# Patient Record
Sex: FEMALE | Race: WHITE | URBAN - METROPOLITAN AREA
[De-identification: names, ages, dates, MRNs, and addresses within clinical notes are randomized per-mention and may not be internally consistent; named-entity substitution may affect disease eponyms.]

---

## 2020-10-01 ENCOUNTER — OFFICE VISIT (OUTPATIENT)
Dept: ENT CLINIC | Age: 25
End: 2020-10-01

## 2020-10-01 VITALS
HEART RATE: 93 BPM | HEIGHT: 68 IN | WEIGHT: 140.8 LBS | BODY MASS INDEX: 21.34 KG/M2 | SYSTOLIC BLOOD PRESSURE: 115 MMHG | TEMPERATURE: 98.2 F | DIASTOLIC BLOOD PRESSURE: 79 MMHG

## 2020-10-01 PROCEDURE — 99203 OFFICE O/P NEW LOW 30 MIN: CPT | Performed by: OTOLARYNGOLOGY

## 2020-10-01 RX ORDER — FLUTICASONE PROPIONATE 50 MCG
1 SPRAY, SUSPENSION (ML) NASAL DAILY
COMMUNITY

## 2020-10-01 RX ORDER — ESCITALOPRAM OXALATE 10 MG/1
10 TABLET ORAL DAILY
COMMUNITY
Start: 2020-06-25

## 2020-10-01 NOTE — PROGRESS NOTES
Subjective:      Patient ID: Brandon Srinivasan is a 26 y.o. female. HPI  Voice Disorders  CC: hoarseness  Keny Jimenez is a(n) 26 y.o. female who presents with a 6 month history of vocal fatigue, raspiness, worse by the tee of the week. Had a hemithyroidectomy 3+ years ago. No voice problems after. TSH normal. .   Daily:Yes  Worse in in the evening  Dysphagia:No  Odynophagia:No  Cough:No  Dyspnea: No  Recent cold or flu:No  Reflux disease: No  History of throat or chest surgery:No  The voice is gradually worsening  A lack of voice effects my daily life greatly  Previous episodes:No  Prior voice therapy:No      There is no problem list on file for this patient. History reviewed. No pertinent surgical history. History reviewed. No pertinent family history.   Social History     Socioeconomic History    Marital status: Not on file     Spouse name: Not on file    Number of children: Not on file    Years of education: Not on file    Highest education level: Not on file   Occupational History    Not on file   Social Needs    Financial resource strain: Not on file    Food insecurity     Worry: Not on file     Inability: Not on file    Transportation needs     Medical: Not on file     Non-medical: Not on file   Tobacco Use    Smoking status: Never Smoker    Smokeless tobacco: Never Used   Substance and Sexual Activity    Alcohol use: Yes     Comment: rare    Drug use: Never    Sexual activity: Not on file   Lifestyle    Physical activity     Days per week: Not on file     Minutes per session: Not on file    Stress: Not on file   Relationships    Social connections     Talks on phone: Not on file     Gets together: Not on file     Attends Shinto service: Not on file     Active member of club or organization: Not on file     Attends meetings of clubs or organizations: Not on file     Relationship status: Not on file    Intimate partner violence     Fear of current or ex partner: Not on file     Emotionally abused: Not on file     Physically abused: Not on file     Forced sexual activity: Not on file   Other Topics Concern    Not on file   Social History Narrative    Not on file       DRUG/FOOD ALLERGIES: Patient has no known allergies. CURRENT MEDICATIONS  Prior to Admission medications    Medication Sig Start Date End Date Taking? Authorizing Provider   escitalopram (LEXAPRO) 10 MG tablet Take 10 mg by mouth daily 6/25/20  Yes Historical Provider, MD   fluticasone (FLONASE) 50 MCG/ACT nasal spray 1 spray by Nasal route daily   Yes Historical Provider, MD   Cetirizine HCl (ZYRTEC PO) Take 10 mg by mouth daily   Yes Historical Provider, MD   IRON, FERROUS SULFATE, PO One by mouth daily during menses 12/19/18  Yes Historical Provider, MD         Review of Systems    Objective:   Physical Exam  Constitutional:       Appearance: She is well-developed. She is not ill-appearing. HENT:      Head: Normocephalic and atraumatic. Not macrocephalic and not microcephalic. No raccoon eyes, Frost's sign, abrasion, contusion, right periorbital erythema, left periorbital erythema or laceration. Hair is normal.      Jaw: No trismus. Salivary Glands: Right salivary gland is not diffusely enlarged. Left salivary gland is not diffusely enlarged. Right Ear: Hearing, tympanic membrane and external ear normal. No decreased hearing noted. No drainage, swelling or tenderness. No middle ear effusion. No mastoid tenderness. Tympanic membrane is not perforated, retracted or bulging. Tympanic membrane has normal mobility. Left Ear: Hearing, tympanic membrane and external ear normal. No decreased hearing noted. No drainage, swelling or tenderness. No middle ear effusion. No mastoid tenderness. Tympanic membrane is not perforated, retracted or bulging. Tympanic membrane has normal mobility. Ears:      Reyes exam findings: does not lateralize. Right Rinne: AC > BC.      Left Rinne: AC > BC. Nose: No nasal deformity, septal deviation, laceration, mucosal edema or rhinorrhea. Right Nostril: No epistaxis. Left Nostril: No epistaxis. Right Turbinates: Not enlarged. Left Turbinates: Not enlarged. Right Sinus: No maxillary sinus tenderness or frontal sinus tenderness. Left Sinus: No maxillary sinus tenderness or frontal sinus tenderness. Mouth/Throat:      Lips: No lesions. Mouth: Mucous membranes are not pale, not dry and not cyanotic. No lacerations or oral lesions. Dentition: Normal dentition. No dental caries or dental abscesses. Tongue: No lesions. Palate: No mass. Pharynx: Uvula midline. No oropharyngeal exudate, posterior oropharyngeal erythema or uvula swelling. Tonsils: No tonsillar abscesses. Eyes:      General: Lids are normal. Lids are everted, no foreign bodies appreciated. Right eye: No discharge. Left eye: No discharge. Extraocular Movements:      Right eye: Normal extraocular motion and no nystagmus. Left eye: Normal extraocular motion and no nystagmus. Conjunctiva/sclera:      Right eye: No chemosis or exudate. Left eye: No chemosis or exudate. Neck:      Musculoskeletal: Neck supple. Thyroid: No thyroid mass or thyromegaly. Vascular: Normal carotid pulses. Trachea: Trachea normal. No tracheal deviation. Cardiovascular:      Rate and Rhythm: Normal rate and regular rhythm. Pulmonary:      Effort: No tachypnea, bradypnea or respiratory distress. Breath sounds: No stridor. Musculoskeletal:      Right shoulder: She exhibits normal range of motion. Left shoulder: She exhibits normal range of motion. Lymphadenopathy:      Head:      Right side of head: No submental, submandibular, tonsillar, preauricular, posterior auricular or occipital adenopathy.       Left side of head: No submental, submandibular, tonsillar, preauricular, posterior auricular or occipital adenopathy. Cervical:      Right cervical: No superficial, deep or posterior cervical adenopathy. Left cervical: No superficial, deep or posterior cervical adenopathy. Skin:     Findings: No bruising, erythema, laceration or lesion. Neurological:      Mental Status: She is alert and oriented to person, place, and time. Psychiatric:         Speech: Speech normal.         Behavior: Behavior normal.         Assessment:       Diagnosis Orders   1. Hoarse voice quality             Plan:      Referral to SLP for videostrobe and voice therapy.   Follow up after         Denton Garrido MD

## 2020-10-19 ENCOUNTER — OFFICE VISIT (OUTPATIENT)
Dept: SPEECH THERAPY | Age: 25
End: 2020-10-19
Payer: COMMERCIAL

## 2020-10-19 ENCOUNTER — OFFICE VISIT (OUTPATIENT)
Dept: ENT CLINIC | Age: 25
End: 2020-10-19
Payer: COMMERCIAL

## 2020-10-19 VITALS
TEMPERATURE: 97.9 F | HEART RATE: 79 BPM | DIASTOLIC BLOOD PRESSURE: 72 MMHG | BODY MASS INDEX: 21.49 KG/M2 | WEIGHT: 141.8 LBS | SYSTOLIC BLOOD PRESSURE: 99 MMHG | HEIGHT: 68 IN

## 2020-10-19 PROCEDURE — 31579 LARYNGOSCOPY TELESCOPIC: CPT | Performed by: SPEECH-LANGUAGE PATHOLOGIST

## 2020-10-19 PROCEDURE — 99212 OFFICE O/P EST SF 10 MIN: CPT | Performed by: OTOLARYNGOLOGY

## 2020-10-19 NOTE — PROGRESS NOTES
Subjective:      Patient ID: Raya Corado is a 26 y.o. female. Voice Disorders  CC: hoarseness  Teena Lamb is a(n) 26 y.o. female who presents with a 6 month history of vocal fatigue, raspiness, worse by the tee of the week. Had a hemithyroidectomy 3+ years ago. No voice problems after. TSH normal. .   Daily:Yes  Worse in in the evening  Dysphagia:No  Odynophagia:No  Cough:No  Dyspnea: No  Recent cold or flu:No  Reflux disease: No  History of throat or chest surgery:No  The voice is gradually worsening  A lack of voice effects my daily life greatly  Previous episodes:No  Prior voice therapy:No     Reviewed videostroboscopy with SLP. Bilateral small nodules, anterior third. Mild pachydermia. Cobblestoning. There is no problem list on file for this patient. History reviewed. No pertinent surgical history. History reviewed. No pertinent family history.   Social History     Socioeconomic History    Marital status:      Spouse name: Not on file    Number of children: Not on file    Years of education: Not on file    Highest education level: Not on file   Occupational History    Not on file   Social Needs    Financial resource strain: Not on file    Food insecurity     Worry: Not on file     Inability: Not on file    Transportation needs     Medical: Not on file     Non-medical: Not on file   Tobacco Use    Smoking status: Never Smoker    Smokeless tobacco: Never Used   Substance and Sexual Activity    Alcohol use: Yes     Comment: rare    Drug use: Never    Sexual activity: Not on file   Lifestyle    Physical activity     Days per week: Not on file     Minutes per session: Not on file    Stress: Not on file   Relationships    Social connections     Talks on phone: Not on file     Gets together: Not on file     Attends Sikhism service: Not on file     Active member of club or organization: Not on file     Attends meetings of clubs or organizations: Not on Ears:      Reyes exam findings: does not lateralize. Right Rinne: AC > BC. Left Rinne: AC > BC. Nose: No nasal deformity, septal deviation, laceration, mucosal edema or rhinorrhea. Right Nostril: No epistaxis. Left Nostril: No epistaxis. Right Turbinates: Not enlarged. Left Turbinates: Not enlarged. Right Sinus: No maxillary sinus tenderness or frontal sinus tenderness. Left Sinus: No maxillary sinus tenderness or frontal sinus tenderness. Mouth/Throat:      Lips: No lesions. Mouth: Mucous membranes are not pale, not dry and not cyanotic. No lacerations or oral lesions. Dentition: Normal dentition. No dental caries or dental abscesses. Tongue: No lesions. Palate: No mass. Pharynx: Uvula midline. No oropharyngeal exudate, posterior oropharyngeal erythema or uvula swelling. Tonsils: No tonsillar abscesses. Eyes:      General: Lids are normal. Lids are everted, no foreign bodies appreciated. Right eye: No discharge. Left eye: No discharge. Extraocular Movements:      Right eye: Normal extraocular motion and no nystagmus. Left eye: Normal extraocular motion and no nystagmus. Conjunctiva/sclera:      Right eye: No chemosis or exudate. Left eye: No chemosis or exudate. Neck:      Musculoskeletal: Neck supple. Thyroid: No thyroid mass or thyromegaly. Vascular: Normal carotid pulses. Trachea: Trachea normal. No tracheal deviation. Cardiovascular:      Rate and Rhythm: Normal rate and regular rhythm. Pulmonary:      Effort: No tachypnea, bradypnea or respiratory distress. Breath sounds: No stridor. Musculoskeletal:      Right shoulder: She exhibits normal range of motion. Left shoulder: She exhibits normal range of motion. Lymphadenopathy:      Head:      Right side of head: No submental, submandibular, tonsillar, preauricular, posterior auricular or occipital adenopathy.

## 2020-10-20 NOTE — PROGRESS NOTES
[facility] ([facility]) ENT  Videostroboscopic Examination of the Larynx    BACKGROUND HISTORY:  Pt w/ hemithyroidectomy approx 4 years ago and onset of intermittent hoarseness since. However, increase in hoarseness for the last 6 months. Pt was a performed in high school/early college, and did note occasionally voice loss but normally in conjunction w/ URI. Pt is currently a  and required to use voice t/o the day; reported voice feels normal at beginning of week, and continually worsens as the week goes on. Characterized voice as lowered pitch, rough, fatigued, and decreased range. Denied pain w/ voicing. During voice, pt rated voice at 7/10 (10 being normal). Denies dyspnea or dysphagia; does experience occasional heartburn and sensation of food \"sticking\", but typically liquid wash clears. Pt does have allergies and experiences frequent PND; utilizes Flonase and Claritin to assist.     Surgical/Medical History: Hemithyroidectomy 2016  Hydration: >64oz of water  Smoking History: None  Caffeine Intake: Coffee, tea    PER ENT NOTE, Dr. Bina Salmon, 10/19/20:  Voice therapy. Follow up as needed. Perceptual Quality: Pt presented with mild dysphonia characterized by intermittent roughness and breathiness. Rigid Stroboscopy Laryngoscopy  Procedure : Rigid Stroboscopy Laryngoscopy  Performed by: LYNN Myers  Anesthesia: Afrin with 4% lidocaine  Description:  The scope was passed along the lingual surface to the level of the larynx. There was no evidence of concerning masses or lesions of the base of tongue, vallecula, epiglottis, aryepiglottic folds, arytenoids, false vocal folds, true vocal folds, or pyriform sinuses. The scope was removed. The patient tolerated the procedure without difficulty. There were no complications.   Pertinent findings: See Assessment and Plan of Care       Bilateral lesion          Posterior cricoid edema     Inhalation         Abduction     Adduction          Spindle shaped closure    ASSESSMENT AND PLAN OF CARE:   26 y.o. female w/ hx of worsening hoarseness for approx 6 months. VLS revealed bilateral edematous changes to anterior 1/3rd of TVFs, medial/striking edge. Lesions noted to be pinpoint and observed more prominently during inhalation. Glottic closure characterized as spindle shaped w/ functional mucosal wave and periodicity. No erythema, indicative of low phonotraumatic behaviors. Mild supraglottic compression (LM<AP). Mild interarytenoid pachydermia and posterior edema, erythema and thick mucus were observed, indicative of laryngopharyngeal reflux. Subglottis was patent without evidence of narrowing. No mass lesions, paresis or paralysis was noted. 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 Educated to trial personal amplification device to provide biofeedback and reduced strain/fatigue, and discussed Gaviscon and daily sinus rinse to assist w/ PND and residual irritation. Provided w/ SOVT strawflow for supraglottic compression and will transition into conversational therapy techniques at next session. RECOMMENDATIONS:   1. Dr. Sal Course reviewed or will review recorded evaluation to assist in diagnosis and provide assessment and plan for treatment. 2. Follow good vocal hygiene behaviors and precautions, including increasing oral hydration. 3. Follow dietary precautions and behavioral lifestyle changes regarding laryngopharyngeal reflux, including taking PPI as prescribed by physician. 4. Voice therapy to focus on re-strengthening and balancing the laryngeal musculature, to promote to open oral front focus, to promote using adequate diaphragmatic breath support to sustain conversational speech. 5. Pt is a good candidate for further medical evaluation/intervention at the discretion of the treating physician.    6. Pt

## 2020-11-02 ENCOUNTER — PROCEDURE VISIT (OUTPATIENT)
Dept: SPEECH THERAPY | Age: 25
End: 2020-11-02
Payer: COMMERCIAL

## 2020-11-02 PROCEDURE — 92507 TX SP LANG VOICE COMM INDIV: CPT | Performed by: SPEECH-LANGUAGE PATHOLOGIST

## 2020-11-03 NOTE — PROGRESS NOTES
[facility]) ENT  Voice Therapy      Pt Seen for Therapy - Session # 1     Diagnosis/Indication:   Primary: Dysphonia  Secondary: Bilateral Nodules  Tertiary: LPR    Background History:   Pt w/ hemithyroidectomy approx 4 years ago and onset of intermittent hoarseness since. However, increase in hoarseness for the last 6 months. Pt was a performed in high school/early college, and did note occasionally voice loss but normally in conjunction w/ URI. Pt is currently a  and required to use voice t/o the day; reported voice feels normal at beginning of week, and continually worsens as the week goes on. Characterized voice as lowered pitch, rough, fatigued, and decreased range. Denied pain w/ voicing. During voice, pt rated voice at 7/10 (10 being normal). Denies dyspnea or dysphagia; does experience occasional heartburn and sensation of food \"sticking\", but typically liquid wash clears. Pt does have allergies and experiences frequent PND; utilizes Flonase and Claritin to assist.     Previous SLP Evaluations: 10/19/20  VLS revealed bilateral edematous changes to anterior 1/3rd of TVFs, medial/striking edge. Lesions noted to be pinpoint and observed more prominently during inhalation. Glottic closure characterized as spindle shaped w/ functional mucosal wave and periodicity. No erythema, indicative of low phonotraumatic behaviors. Mild supraglottic compression (LM<AP). SUBJECTIVE:   Pt endorsed slight improvement in vocal quality; decreased fatigue at the end of the week, but significantly improved. Voice mildly rough on Fridays where previously, but would have lost voice completely. 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 Pt decreased caffeine intake and increased water. Completing exercises as recommended. New onset of globus sensation and concerned regarding silent reflux.      OBJECTIVE:   Voice Therapy    Goal: Session 1 Session 2 Session 3   Pt will

## 2020-11-18 ENCOUNTER — PROCEDURE VISIT (OUTPATIENT)
Dept: SPEECH THERAPY | Age: 25
End: 2020-11-18
Payer: COMMERCIAL

## 2020-11-18 PROCEDURE — 92507 TX SP LANG VOICE COMM INDIV: CPT | Performed by: SPEECH-LANGUAGE PATHOLOGIST

## 2020-11-19 NOTE — PROGRESS NOTES
[facility] ([facility]) ENT  Voice Therapy      Pt Seen for Therapy - Session # 2     Diagnosis/Indication:   Primary: Dysphonia  Secondary: Bilateral Nodules  Tertiary: LPR    Background History:   Pt w/ hemithyroidectomy approx 4 years ago and onset of intermittent hoarseness since. However, increase in hoarseness for the last 6 months. Pt was a performed in high school/early college, and did note occasionally voice loss but normally in conjunction w/ URI. Pt is currently a  and required to use voice t/o the day; reported voice feels normal at beginning of week, and continually worsens as the week goes on. Characterized voice as lowered pitch, rough, fatigued, and decreased range. Denied pain w/ voicing. During voice, pt rated voice at 7/10 (10 being normal). Denies dyspnea or dysphagia; does experience occasional heartburn and sensation of food \"sticking\", but typically liquid wash clears. Pt does have allergies and experiences frequent PND; utilizes Flonase and Claritin to assist.     Previous SLP Evaluations: 10/19/20  VLS revealed bilateral edematous changes to anterior 1/3rd of TVFs, medial/striking edge. Lesions noted to be pinpoint and observed more prominently during inhalation. Glottic closure characterized as spindle shaped w/ functional mucosal wave and periodicity. No erythema, indicative of low phonotraumatic behaviors. Mild supraglottic compression (LM<AP). SUBJECTIVE:   Pt reported ongoing improvement in dysphonia and specifically, vocal fatigue at end of work week. Continues to implement reflux management, and feels this has made the largest difference; now feels onset of globus sensation and uses Gaviscon to assist. Completing exercises as recommended. Has not used amplification device, as pt stated it makes her \"feel defeated\". 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      OBJECTIVE:   Voice Therapy    Goal: Session 1 Session 2 Session 3   Pt will adhere to vocal hygiene protocol during daily life activities w/ 80% acc Pt adhered to vocal hygiene protocol w/ 70% acc    Pt increasing water and taking vocal naps t/o the day; reported completing exercises as recommended, but did decrease this past week. Encouraged to continue implementation daily to assist w/ ongoing vocal fatigue. 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 Feels she is implementing forward-focused phonation during conversation, but is able to note when voice feels fatigued and w/o generalization. Discussed amplification further in context of keeping voice at neutral speaking level and reducing need to strain for volume. Pt expressed understanding and will begin trialing over the next few weeks. Pt will adhere to reflux management protocol during daily life activities w/ 80% acc Pt adhered to reflux management protocol w/ 65% acc    Pt endorsed decreasing caffeine, but has not been taking Gaviscon as recommended and now experiencing globus sensation. Reviewed silent reflux and provided w/ handout on reflux management, including dietary and behavioral changes. Pt expressed understanding. Pt adhered to reflux management protocol w/ 80% acc    GOAL MET    Reduced spicy/acidic/fried foods, including frequent consumption of coffee. Uses Gaviscon as recommended and does feel significant difference. Stated that poor reflux management does impact vocal fatigue.      Pt will perform SOVT techniques during various voicing tasks w/ 80% acc Pt performed SOVT techniques via    Strawflow w/ sustained pitch, ascending/descending glides, and sirens w/ 70% acc; required min cues to lighten voicing, as noted to push and result in mild strain. Cues for light, easy voicing effective and adequate resonance achieved. Goal not directly targeted this session. Encouraged to complete as warm-up and cool-down prior to additional exercises and pre/post teaching. Pt will perform RVT techniques during various voicing tasks w/ 80% acc Pt performed RVT techniques via    Humming in isolation w/ 60% acc; little-no difficulties achieving forward-focused phonation however, again noted to \"push\" and required cues to lighten voicing. Pt endorsed stronger vibratory sensation after completing. Tactile feedback utilized and pt endorsed feeling voice coming from [employer]". Pt extremely stimulable and vocally aware, and so transitioned to humming + /m/ sentences; mild difficulties maintaining placement at end of sentences d/t decreased breath support. Required cue to \"cushion sound with air\" and \"think up\" to maintain. Pt able to characterize errors approx 70% of the time, and required ongoing min-mod cues to correction. Created functional phrases to assist w/ generalization and carry-over into daily speech. Pt performed RVT techniques via    Humming in isolation w/ 70% acc; required mod cues to lighten phonation and decrease \"strain\". Observed to have strong, forward-focused resonance. Humming + sentences w/ 75% acc; min-mod cues to maintain phonation until end of sentence, as tendency to become back-focused w/ slight strain at the end. Cue of thinking \"up\" assisted and pt w/ clear vocal quality t/o. Endorsed breath support made a large difference in sensation. Humming + paragraph w/ 75% acc; pt began to independently correct placement at this time, and noted when voice become back-focused at end of sentences. Improved coordination of respiration and phonation to maintain clear, easy vocal quality. Conversational speech w/ 65% acc; pt w/ excellent generalization into conversational speech.  Few instances of increased focus on what was being said vs how pt was saying it, and required mod cues from SLP to correct placement. Pt endorsed mild difficulties w/ exercise, but overall noted to have clear vocal quality w/o strain. Encouraged to continue focus on coordination of respiration and phonation and implement into daily conversations. ASSESSMENT:      26 y.o. female w/ bilateral TVF nodules;  and experiencing vocal fatigue by the end of each week. Ongoing implementation of recommendations, including proper vocal hygiene and reflux management. Overall, pt does feel improvement and noted decreased fatigue at the end of the work day/week. Reviewed RVT humming and worked up to paragraphs; required mod cues for increased breath support and maintaining placement specifically at end of sentences. Pt began to self-correct during session, and able to independently fix placement. Began conversational speech and required mod cues for coordination of breath support; pt endorsed improved forward-focused sensation when utilizing diaphragmatic breath support. Intermittent instances of becoming excited and speaking at louder volume; min cues required to maintain neutral speaking pitch and volume. Overall, pt has made excellent progress; will continue w/ all recommendations and begin using personal amplification device to promote long-term vocal health and maintenance of voice for teaching. Follow-up in one month and repeat videostrobe to assess any changes in anatomy and establish ending baseline. RECOMMENDATIONS:      1.  Follow HEP and RTC in one month  2. Repeat videostrobe       Thank you,    Amaury Paget) [place], [place], [address] [facility]; [phone]  Speech-Language Pathologist

## 2020-12-11 RX ORDER — OMEPRAZOLE 40 MG/1
40 CAPSULE, DELAYED RELEASE ORAL
Qty: 30 CAPSULE | Refills: 2 | Status: SHIPPED | OUTPATIENT
Start: 2020-12-11

## 2020-12-16 ENCOUNTER — PROCEDURE VISIT (OUTPATIENT)
Dept: SPEECH THERAPY | Age: 25
End: 2020-12-16
Payer: COMMERCIAL

## 2020-12-16 PROCEDURE — 92507 TX SP LANG VOICE COMM INDIV: CPT | Performed by: SPEECH-LANGUAGE PATHOLOGIST

## 2020-12-16 NOTE — PROGRESS NOTES
[facility] ([facility]) ENT  Voice Therapy      Pt Seen for Therapy - Session # 3     Diagnosis/Indication:   Primary: Dysphonia  Secondary: Bilateral Nodules  Tertiary: LPR    Background History:   Pt w/ hemithyroidectomy approx 4 years ago and onset of intermittent hoarseness since. However, increase in hoarseness for the last 6 months. Pt was a performed in high school/early college, and did note occasionally voice loss but normally in conjunction w/ URI. Pt is currently a  and required to use voice t/o the day; reported voice feels normal at beginning of week, and continually worsens as the week goes on. Characterized voice as lowered pitch, rough, fatigued, and decreased range. Denied pain w/ voicing. During voice, pt rated voice at 7/10 (10 being normal). Denies dyspnea or dysphagia; does experience occasional heartburn and sensation of food \"sticking\", but typically liquid wash clears. Pt does have allergies and experiences frequent PND; utilizes Flonase and Claritin to assist.     Previous SLP Evaluations: 10/19/20  VLS revealed bilateral edematous changes to anterior 1/3rd of TVFs, medial/striking edge. Lesions noted to be pinpoint and observed more prominently during inhalation. Glottic closure characterized as spindle shaped w/ functional mucosal wave and periodicity. No erythema, indicative of low phonotraumatic behaviors. Mild supraglottic compression (LM<AP). SUBJECTIVE:   Pt endorsed voice is doing well and has had no significant vocal difficulties since last appointment. Concerned regarding LPR and ongoing globus sensation; contacted ENT and placed on Omeprazole. Began taking Sunday and has not felt a difference, but reported understanding that it may take a few weeks. Endorsed sensation of thick mucus and PND.     OBJECTIVE:   Voice Therapy    Goal: Session 1 Session 2 Session 3   Pt will adhere to vocal hygiene protocol during daily life activities w/ 80% acc Pt adhered to vocal hygiene protocol w/ 70% acc    Pt increasing water and taking vocal naps t/o the day; reported completing exercises as recommended, but did decrease this past week. Encouraged to continue implementation daily to assist w/ ongoing vocal fatigue. 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 Feels she is implementing forward-focused phonation during conversation, but is able to note when voice feels fatigued and w/o generalization. Discussed amplification further in context of keeping voice at neutral speaking level and reducing need to strain for volume. Pt expressed understanding and will begin trialing over the next few weeks. Pt adhered to vocal hygiene protocol w/ 80% acc    GOAL MET    Pt increased hydration and has began to utilize personal amplification device; has made significant difference in vocal fatigue and no longer feels voice \"fading\" t/o the week. Sustainable and w/o hoarseness or strain. 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 Pt expressed understanding and has largely improved vocal awareness. Educated to RTC should voice become hoarse or aphonic. Pt will adhere to reflux management protocol during daily life activities w/ 80% acc Pt adhered to reflux management protocol w/ 65% acc    Pt endorsed decreasing caffeine, but has not been taking Gaviscon as recommended and now experiencing globus sensation. Reviewed silent reflux and provided w/ handout on reflux management, including dietary and behavioral changes. Pt expressed understanding. Pt adhered to reflux management protocol w/ 80% acc    GOAL MET    Reduced spicy/acidic/fried foods, including frequent consumption of coffee.  Uses Gaviscon as recommended and does feel significant difference. Stated that poor reflux management does impact vocal fatigue. Pt adhered to reflux management protocol w/ 80% acc    GOAL MET    Ongoing management of reflux w/ diet and use of Gaviscon; recently added Omeprazole to assist w/ globus sensation. Ongoing monitoring warranted. Pt will perform SOVT techniques during various voicing tasks w/ 80% acc Pt performed SOVT techniques via    Strawflow w/ sustained pitch, ascending/descending glides, and sirens w/ 70% acc; required min cues to lighten voicing, as noted to push and result in mild strain. Cues for light, easy voicing effective and adequate resonance achieved. Goal not directly targeted this session. Encouraged to complete as warm-up and cool-down prior to additional exercises and pre/post teaching. Goal not directly targeted this session. Encouraged to complete as warm-up and cool-down pre/post teaching moving forward to ensure ongoing placement of voice. Pt will perform RVT techniques during various voicing tasks w/ 80% acc Pt performed RVT techniques via    Humming in isolation w/ 60% acc; little-no difficulties achieving forward-focused phonation however, again noted to \"push\" and required cues to lighten voicing. Pt endorsed stronger vibratory sensation after completing. Tactile feedback utilized and pt endorsed feeling voice coming from [employer]". Pt extremely stimulable and vocally aware, and so transitioned to humming + /m/ sentences; mild difficulties maintaining placement at end of sentences d/t decreased breath support. Required cue to \"cushion sound with air\" and \"think up\" to maintain. Pt able to characterize errors approx 70% of the time, and required ongoing min-mod cues to correction. Created functional phrases to assist w/ generalization and carry-over into daily speech.     Pt performed RVT techniques via    Humming in isolation w/ 70% acc; required mod cues to lighten phonation and decrease \"strain\". Observed to have strong, forward-focused resonance. Humming + sentences w/ 75% acc; min-mod cues to maintain phonation until end of sentence, as tendency to become back-focused w/ slight strain at the end. Cue of thinking \"up\" assisted and pt w/ clear vocal quality t/o. Endorsed breath support made a large difference in sensation. Humming + paragraph w/ 75% acc; pt began to independently correct placement at this time, and noted when voice become back-focused at end of sentences. Improved coordination of respiration and phonation to maintain clear, easy vocal quality. Conversational speech w/ 65% acc; pt w/ excellent generalization into conversational speech. Few instances of increased focus on what was being said vs how pt was saying it, and required mod cues from SLP to correct placement. Pt endorsed mild difficulties w/ exercise, but overall noted to have clear vocal quality w/o strain. Encouraged to continue focus on coordination of respiration and phonation and implement into daily conversations. Pt performed RVT techniques via    Conversation w/ 80% acc; pt able to sustain all conversation w/o roughness or misplacement of voice. Greatly reduced phonotraumatic behaviors including elevated volume. GOAL MET                   ASSESSMENT:      26 y.o. female w/ bilateral TVF nodules;  and experiencing vocal fatigue by the end of each week. Pt has implemented all compensations and is now able to sustain voice t/o week. No vocal fatigue and no strain or roughness. Using personal amplification device and feels the transition has been both easy and helpful. Greatest concern involving reflux at this time; recently placed on Omeprazole and educated that it may take a few more days to see full improvement. Will continue to implement dietary changes and use Gaviscon.  Therapeutic videostrobe performed and revealed decreased edematous and erythematous changes of the TVFs bilaterally; ongoing bilateral pinpoint nodules on anterior 1/3rd. Mildly decreased interarytenoid pachydermia. Pt is now able to sustain forward-focused placement during all conversation w/o additional cues required; voice is light and clear. 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 Pt expressed understanding and overall, excellent progress has been made. RECOMMENDATIONS:      1. Follow HEP and RTC as needed  2.  F/u w/ ENT as recommended      Thank you,    Yaneth Childress) [facility]; [id number]  Speech-Language Pathologist

## 2021-02-08 ENCOUNTER — OFFICE VISIT (OUTPATIENT)
Dept: ENT CLINIC | Age: 26
End: 2021-02-08
Payer: COMMERCIAL

## 2021-02-08 ENCOUNTER — PROCEDURE VISIT (OUTPATIENT)
Dept: SPEECH THERAPY | Age: 26
End: 2021-02-08
Payer: COMMERCIAL

## 2021-02-08 VITALS
BODY MASS INDEX: 21.37 KG/M2 | HEIGHT: 68 IN | DIASTOLIC BLOOD PRESSURE: 73 MMHG | SYSTOLIC BLOOD PRESSURE: 107 MMHG | TEMPERATURE: 97.1 F | WEIGHT: 141 LBS | HEART RATE: 97 BPM

## 2021-02-08 DIAGNOSIS — R49.0 DYSPHONIA: ICD-10-CM

## 2021-02-08 DIAGNOSIS — J38.2 VOCAL FOLD NODULES: ICD-10-CM

## 2021-02-08 DIAGNOSIS — K21.9 GASTROESOPHAGEAL REFLUX DISEASE, UNSPECIFIED WHETHER ESOPHAGITIS PRESENT: Primary | ICD-10-CM

## 2021-02-08 PROCEDURE — 99214 OFFICE O/P EST MOD 30 MIN: CPT | Performed by: OTOLARYNGOLOGY

## 2021-02-08 PROCEDURE — 92507 TX SP LANG VOICE COMM INDIV: CPT | Performed by: SPEECH-LANGUAGE PATHOLOGIST

## 2021-02-08 RX ORDER — FAMOTIDINE 20 MG/1
20 TABLET, FILM COATED ORAL 2 TIMES DAILY
Qty: 60 TABLET | Refills: 3 | Status: SHIPPED | OUTPATIENT
Start: 2021-02-08

## 2021-02-08 NOTE — PROGRESS NOTES
will adhere to vocal hygiene protocol during daily life activities w/ 80% acc Pt adhered to vocal hygiene protocol w/ 70% acc    Pt increasing water and taking vocal naps t/o the day; reported completing exercises as recommended, but did decrease this past week. Encouraged to continue implementation daily to assist w/ ongoing vocal fatigue. 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 Feels she is implementing forward-focused phonation during conversation, but is able to note when voice feels fatigued and w/o generalization. Discussed amplification further in context of keeping voice at neutral speaking level and reducing need to strain for volume. Pt expressed understanding and will begin trialing over the next few weeks. Pt adhered to vocal hygiene protocol w/ 80% acc    GOAL MET    Pt increased hydration and has began to utilize personal amplification device; has made significant difference in vocal fatigue and no longer feels voice \"fading\" t/o the week. Sustainable and w/o hoarseness or strain. 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 Pt expressed understanding and has largely improved vocal awareness. Educated to RTC should voice become hoarse or aphonic. GOAL MET    Continues to use personal amplification device and feels this has made a significant difference. Using RVT in conversational speech but ongoing mild fatigue at the end of the week; has not been completing SOVT strawflow and encouraged to complete daily for ongoing decreased tension.      Pt will adhere to reflux management protocol during daily life activities w/ 80% acc Pt adhered to reflux management protocol w/ 65% acc    Pt endorsed decreasing caffeine, but has not been taking Gaviscon as recommended and now experiencing globus sensation. Reviewed silent reflux and provided w/ handout on reflux management, including dietary and behavioral changes. Pt expressed understanding. Pt adhered to reflux management protocol w/ 80% acc    GOAL MET    Reduced spicy/acidic/fried foods, including frequent consumption of coffee. Uses Gaviscon as recommended and does feel significant difference. Stated that poor reflux management does impact vocal fatigue. Pt adhered to reflux management protocol w/ 80% acc    GOAL MET    Ongoing management of reflux w/ diet and use of Gaviscon; recently added Omeprazole to assist w/ globus sensation. Ongoing monitoring warranted. GOAL MET    Continues to use Gaviscon and watch dietary habits however, intermittent sensation of globus. Interested in long-term reflux medication and will d/w ENT at f/u. Pt will perform SOVT techniques during various voicing tasks w/ 80% acc Pt performed SOVT techniques via    Strawflow w/ sustained pitch, ascending/descending glides, and sirens w/ 70% acc; required min cues to lighten voicing, as noted to push and result in mild strain. Cues for light, easy voicing effective and adequate resonance achieved. Goal not directly targeted this session. Encouraged to complete as warm-up and cool-down prior to additional exercises and pre/post teaching. Goal not directly targeted this session. Encouraged to complete as warm-up and cool-down pre/post teaching moving forward to ensure ongoing placement of voice. Goal not directly targeted this session. Pt endorsed not completing at this time; encouraged to begin using daily to assist w/ reduction of edema/irritation t/o the work and maintain neutral laryngeal posture during teaching/conversation. Expressed understanding.     Pt will perform RVT techniques during various voicing tasks w/ 80% acc Pt performed RVT techniques via    Humming in isolation w/ 60% acc; little-no difficulties achieving forward-focused phonation however, again noted to \"push\" and required cues to lighten voicing. Pt endorsed stronger vibratory sensation after completing. Tactile feedback utilized and pt endorsed feeling voice coming from [employer]". Pt extremely stimulable and vocally aware, and so transitioned to humming + /m/ sentences; mild difficulties maintaining placement at end of sentences d/t decreased breath support. Required cue to \"cushion sound with air\" and \"think up\" to maintain. Pt able to characterize errors approx 70% of the time, and required ongoing min-mod cues to correction. Created functional phrases to assist w/ generalization and carry-over into daily speech. Pt performed RVT techniques via    Humming in isolation w/ 70% acc; required mod cues to lighten phonation and decrease \"strain\". Observed to have strong, forward-focused resonance. Humming + sentences w/ 75% acc; min-mod cues to maintain phonation until end of sentence, as tendency to become back-focused w/ slight strain at the end. Cue of thinking \"up\" assisted and pt w/ clear vocal quality t/o. Endorsed breath support made a large difference in sensation. Humming + paragraph w/ 75% acc; pt began to independently correct placement at this time, and noted when voice become back-focused at end of sentences. Improved coordination of respiration and phonation to maintain clear, easy vocal quality. Conversational speech w/ 65% acc; pt w/ excellent generalization into conversational speech. Few instances of increased focus on what was being said vs how pt was saying it, and required mod cues from SLP to correct placement. Pt endorsed mild difficulties w/ exercise, but overall noted to have clear vocal quality w/o strain. Encouraged to continue focus on coordination of respiration and phonation and implement into daily conversations.     Pt performed RVT techniques via    Conversation w/ 80% acc; pt able to sustain all conversation w/o roughness or misplacement of voice. Greatly reduced phonotraumatic behaviors including elevated volume. GOAL MET GOAL MET    Pt demonstrated conversational carry-over w/ 85% acc. Noted to have intermittent back-focused roughness at end of sentences, and able to correct w/ min cues. 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 Therapeutic videostrobe performed to assess anatomical changes; noted to have decreased erythema and edema of the TVFs bilaterally, including surrounding area of bilateral nodules. Nodules appeared more rounded and decreased in size as compared to previous VLS. Little-no supraglottic compression. Ongoing postcricoid edema. ASSESSMENT:      26 y.o. female w/ bilateral TVF nodules;  and experiencing vocal fatigue by the end of each week. Pt endorsed independent carry-over of RVT strategies into conversational speech and feels fatigue continues to decrease; does experience mild fatigue w/ some roughness. Has not been completing SOVT strawflow; encouraged to begin implementing daily for neutral laryngeal posture and decrease any residual edema/erythema. VLS performed at pt's request to assess any anatomical changes and noted to have decreased edema/erythema and decreased size of nodules. Overall, pt is vocally aware and has made excellent progress. Prognosis is good w/ ongoing adherence to recommendations. Educated regarding long-term effects of nodules and pt w/ good understanding. RECOMMENDATIONS:      1.  Follow HEP and RTC as needed      Thank you,    Obie Fitzgerald Shereen Rom Jackson; [id number]  Speech-Language Pathologist

## 2021-02-08 NOTE — PROGRESS NOTES
Subjective:      Patient ID: Ramses Meyers is a 26 y.o. female. Voice Disorders  CC: hoarseness  Jorgito Pitts is a(n) 26 y.o. female who presents with a 6 month history of vocal fatigue, raspiness, worse by the tee of the week. Had a hemithyroidectomy 3+ years ago. No voice problems after. TSH normal. .   Daily:Yes  Worse in in the evening  Dysphagia:No  Odynophagia:No  Cough:No  Dyspnea: No  Recent cold or flu:No  Reflux disease: No  History of throat or chest surgery:No  The voice is gradually worsening  A lack of voice effects my daily life greatly  Previous episodes:No  Prior voice therapy:No     Reviewed videostroboscopy with SLP. Bilateral small nodules, anterior third. Mild pachydermia. Cobblestoning. Imrpoved with voice therapy. Last videostrobe today. There is no problem list on file for this patient. History reviewed. No pertinent surgical history. History reviewed. No pertinent family history.   Social History     Socioeconomic History    Marital status:      Spouse name: Not on file    Number of children: Not on file    Years of education: Not on file    Highest education level: Not on file   Occupational History    Not on file   Social Needs    Financial resource strain: Not on file    Food insecurity     Worry: Not on file     Inability: Not on file    Transportation needs     Medical: Not on file     Non-medical: Not on file   Tobacco Use    Smoking status: Never Smoker    Smokeless tobacco: Never Used   Substance and Sexual Activity    Alcohol use: Yes     Comment: rare    Drug use: Never    Sexual activity: Not on file   Lifestyle    Physical activity     Days per week: Not on file     Minutes per session: Not on file    Stress: Not on file   Relationships    Social connections     Talks on phone: Not on file     Gets together: Not on file     Attends Orthodoxy service: Not on file     Active member of club or organization: Not on file Attends meetings of clubs or organizations: Not on file     Relationship status: Not on file    Intimate partner violence     Fear of current or ex partner: Not on file     Emotionally abused: Not on file     Physically abused: Not on file     Forced sexual activity: Not on file   Other Topics Concern    Not on file   Social History Narrative    Not on file       DRUG/FOOD ALLERGIES: Patient has no known allergies. CURRENT MEDICATIONS  Prior to Admission medications    Medication Sig Start Date End Date Taking? Authorizing Provider   escitalopram (LEXAPRO) 10 MG tablet Take 10 mg by mouth daily 6/25/20  Yes Historical Provider, MD   Cetirizine HCl (ZYRTEC PO) Take 10 mg by mouth daily   Yes Historical Provider, MD   IRON, FERROUS SULFATE, PO One by mouth daily during menses 12/19/18  Yes Historical Provider, MD   omeprazole (PRILOSEC) 40 MG delayed release capsule Take 1 capsule by mouth every morning (before breakfast)  Patient not taking: Reported on 2/8/2021 12/11/20   Janella Epley, MD   fluticasone [facility]) 50 MCG/ACT nasal spray 1 spray by Nasal route daily    Historical Provider, MD         Review of Systems    Objective:   Physical Exam  Constitutional:       Appearance: She is well-developed. She is not ill-appearing. HENT:      Head: Normocephalic and atraumatic. Not macrocephalic and not microcephalic. No raccoon eyes, Frost's sign, abrasion, contusion, right periorbital erythema, left periorbital erythema or laceration. Hair is normal.      Jaw: No trismus. Salivary Glands: Right salivary gland is not diffusely enlarged. Left salivary gland is not diffusely enlarged. Right Ear: Hearing, tympanic membrane and external ear normal. No decreased hearing noted. No drainage, swelling or tenderness. No middle ear effusion. No mastoid tenderness. Tympanic membrane is not perforated, retracted or bulging. Tympanic membrane has normal mobility.       Left Ear: Hearing, tympanic membrane and external ear normal. No decreased hearing noted. No drainage, swelling or tenderness. No middle ear effusion. No mastoid tenderness. Tympanic membrane is not perforated, retracted or bulging. Tympanic membrane has normal mobility. Ears:      Reyes exam findings: does not lateralize. Right Rinne: AC > BC. Left Rinne: AC > BC. Nose: No nasal deformity, septal deviation, laceration, mucosal edema or rhinorrhea. Right Nostril: No epistaxis. Left Nostril: No epistaxis. Right Turbinates: Not enlarged. Left Turbinates: Not enlarged. Right Sinus: No maxillary sinus tenderness or frontal sinus tenderness. Left Sinus: No maxillary sinus tenderness or frontal sinus tenderness. Mouth/Throat:      Lips: No lesions. Mouth: Mucous membranes are not pale, not dry and not cyanotic. No lacerations or oral lesions. Dentition: Normal dentition. No dental caries or dental abscesses. Tongue: No lesions. Palate: No mass. Pharynx: Uvula midline. No oropharyngeal exudate, posterior oropharyngeal erythema or uvula swelling. Tonsils: No tonsillar abscesses. Eyes:      General: Lids are normal. Lids are everted, no foreign bodies appreciated. Right eye: No discharge. Left eye: No discharge. Extraocular Movements:      Right eye: Normal extraocular motion and no nystagmus. Left eye: Normal extraocular motion and no nystagmus. Conjunctiva/sclera:      Right eye: No chemosis or exudate. Left eye: No chemosis or exudate. Neck:      Musculoskeletal: Neck supple. Thyroid: No thyroid mass or thyromegaly. Vascular: Normal carotid pulses. Trachea: Trachea normal. No tracheal deviation. Cardiovascular:      Rate and Rhythm: Normal rate and regular rhythm. Pulmonary:      Effort: No tachypnea, bradypnea or respiratory distress. Breath sounds: No stridor.    Musculoskeletal:      Right shoulder: She exhibits normal range of motion. Left shoulder: She exhibits normal range of motion. Lymphadenopathy:      Head:      Right side of head: No submental, submandibular, tonsillar, preauricular, posterior auricular or occipital adenopathy. Left side of head: No submental, submandibular, tonsillar, preauricular, posterior auricular or occipital adenopathy. Cervical:      Right cervical: No superficial, deep or posterior cervical adenopathy. Left cervical: No superficial, deep or posterior cervical adenopathy. Skin:     Findings: No bruising, erythema, laceration or lesion. Neurological:      Mental Status: She is alert and oriented to person, place, and time. Psychiatric:         Speech: Speech normal.         Behavior: Behavior normal.         Assessment:       Diagnosis Orders   1. Gastroesophageal reflux disease, unspecified whether esophagitis present  External Referral To Gastroenterology    famotidine (PEPCID) 20 MG tablet   2. Vocal fold nodules  famotidine (PEPCID) 20 MG tablet             Plan:      Voice therapy. Follow up as needed. The patient was counseled for GERD and received a famotidine prescription medication(s) for this diagnosis. The mechanism of action for the prescription(s) were explained/reviewed. The appropriate usage was described and technique for topical use explained if appropriate. Questions from the patient were answered and the prescription(s) were e-prescribed to the appropriate pharmacy.       Mg Lindo MD